# Patient Record
Sex: FEMALE | Race: BLACK OR AFRICAN AMERICAN | NOT HISPANIC OR LATINO | Employment: FULL TIME | URBAN - METROPOLITAN AREA
[De-identification: names, ages, dates, MRNs, and addresses within clinical notes are randomized per-mention and may not be internally consistent; named-entity substitution may affect disease eponyms.]

---

## 2020-03-02 ENCOUNTER — TELEPHONE (OUTPATIENT)
Dept: ORTHOPEDICS | Facility: CLINIC | Age: 30
End: 2020-03-02
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
## 2022-11-08 ENCOUNTER — OFFICE VISIT (OUTPATIENT)
Dept: OBSTETRICS AND GYNECOLOGY | Facility: CLINIC | Age: 32
End: 2022-11-08
Payer: COMMERCIAL

## 2022-11-08 VITALS
WEIGHT: 165.38 LBS | DIASTOLIC BLOOD PRESSURE: 72 MMHG | HEIGHT: 60 IN | SYSTOLIC BLOOD PRESSURE: 118 MMHG | BODY MASS INDEX: 32.47 KG/M2

## 2022-11-08 DIAGNOSIS — Z30.09 BIRTH CONTROL COUNSELING: Primary | ICD-10-CM

## 2022-11-08 PROCEDURE — 3078F PR MOST RECENT DIASTOLIC BLOOD PRESSURE < 80 MM HG: ICD-10-PCS | Mod: CPTII,S$GLB,, | Performed by: OBSTETRICS & GYNECOLOGY

## 2022-11-08 PROCEDURE — 3008F BODY MASS INDEX DOCD: CPT | Mod: CPTII,S$GLB,, | Performed by: OBSTETRICS & GYNECOLOGY

## 2022-11-08 PROCEDURE — 99202 OFFICE O/P NEW SF 15 MIN: CPT | Mod: S$GLB,,, | Performed by: OBSTETRICS & GYNECOLOGY

## 2022-11-08 PROCEDURE — 1159F MED LIST DOCD IN RCRD: CPT | Mod: CPTII,S$GLB,, | Performed by: OBSTETRICS & GYNECOLOGY

## 2022-11-08 PROCEDURE — 3078F DIAST BP <80 MM HG: CPT | Mod: CPTII,S$GLB,, | Performed by: OBSTETRICS & GYNECOLOGY

## 2022-11-08 PROCEDURE — 99202 PR OFFICE/OUTPT VISIT, NEW, LEVL II, 15-29 MIN: ICD-10-PCS | Mod: S$GLB,,, | Performed by: OBSTETRICS & GYNECOLOGY

## 2022-11-08 PROCEDURE — 3074F PR MOST RECENT SYSTOLIC BLOOD PRESSURE < 130 MM HG: ICD-10-PCS | Mod: CPTII,S$GLB,, | Performed by: OBSTETRICS & GYNECOLOGY

## 2022-11-08 PROCEDURE — 3008F PR BODY MASS INDEX (BMI) DOCUMENTED: ICD-10-PCS | Mod: CPTII,S$GLB,, | Performed by: OBSTETRICS & GYNECOLOGY

## 2022-11-08 PROCEDURE — 99999 PR PBB SHADOW E&M-EST. PATIENT-LVL III: CPT | Mod: PBBFAC,,, | Performed by: OBSTETRICS & GYNECOLOGY

## 2022-11-08 PROCEDURE — 99999 PR PBB SHADOW E&M-EST. PATIENT-LVL III: ICD-10-PCS | Mod: PBBFAC,,, | Performed by: OBSTETRICS & GYNECOLOGY

## 2022-11-08 PROCEDURE — 3074F SYST BP LT 130 MM HG: CPT | Mod: CPTII,S$GLB,, | Performed by: OBSTETRICS & GYNECOLOGY

## 2022-11-08 PROCEDURE — 1159F PR MEDICATION LIST DOCUMENTED IN MEDICAL RECORD: ICD-10-PCS | Mod: CPTII,S$GLB,, | Performed by: OBSTETRICS & GYNECOLOGY

## 2022-11-08 RX ORDER — BUPROPION HYDROCHLORIDE 150 MG/1
150 TABLET ORAL EVERY MORNING
COMMUNITY
End: 2023-01-25

## 2022-11-08 RX ORDER — IBUPROFEN 800 MG/1
800 TABLET ORAL 2 TIMES DAILY PRN
COMMUNITY
Start: 2022-11-01 | End: 2023-01-25

## 2022-11-08 RX ORDER — VALACYCLOVIR HYDROCHLORIDE 500 MG/1
TABLET, FILM COATED ORAL
COMMUNITY
Start: 2022-06-14 | End: 2023-01-25

## 2022-11-08 RX ORDER — METHYLPREDNISOLONE 4 MG/1
TABLET ORAL
COMMUNITY
Start: 2022-11-01 | End: 2023-01-25

## 2022-11-08 RX ORDER — KETOCONAZOLE 20 MG/G
CREAM TOPICAL EVERY MORNING
COMMUNITY
Start: 2022-08-22

## 2022-11-08 RX ORDER — FLUOXETINE 20 MG/1
20 TABLET ORAL
COMMUNITY
End: 2023-01-25

## 2022-11-08 RX ORDER — AZELASTINE 1 MG/ML
SPRAY, METERED NASAL
COMMUNITY
Start: 2022-11-01 | End: 2023-01-25

## 2022-11-08 NOTE — PROGRESS NOTES
PT HAS BEEN SEEN AT [facility] FOR [facility] AND REFERRED HER FOR NEXPLANON.    ROS:  GENERAL: No fever, chills, fatigability or weight loss.  VULVAR: No pain, no lesions and no itching.  VAGINAL: No relaxation, no itching, no discharge, no abnormal bleeding and no lesions.  ABDOMEN: No abdominal pain. Denies nausea. Denies vomiting. No diarrhea. No constipation  BREAST: Denies pain. No lumps. No discharge.  URINARY: No incontinence, no nocturia, no frequency and no dysuria.  CARDIOVASCULAR: No chest pain. No shortness of breath. No leg cramps.  NEUROLOGICAL: No headaches. No vision changes.  The remainder of the review of systems was negative.    PE:  General Appearance: overweight And Well developed. Well nourished. In no acute distress.    PROCEDURES:    PLAN:     DIAGNOSIS:  1. Birth control counseling        MEDICATIONS & ORDERS:  Orders Placed This Encounter    Liquid-Based Pap Smear, Screening       20 MIN D/W PT ON TYPES OF RELIABLE BIRTH CONTROL AND RISKS AND BENEFITS  The use of hormonal contraception has been fully discussed with the patient. We discussed all options including OCPs, transdermal patches, vaginal ring, Depo Provera injections, Implanon, and IUD. Warnings about anticipated minor side effects such as breakthrough spotting, nausea, breast tenderness, weight changes, acne, headaches, etc were given. She has been told of the more serious potential side effects such as MI, stroke, and deep vein thrombosis, all of which are very unlikely. She has been asked to report any signs of such serious problems immediately. The need for additional protection, such as a condom, to prevent exposure to sexually transmitted diseases has also been discussed- the patient has been clearly reminded that no hormonal contraceptive method can protect her against diseases such as HIV and others. She understands and wishes to take the medication as prescribed. She wishes to begin oral contraceptives  (estrogen/progesterone)      FOLLOW-UP: With me NEXPLANON.    Anuj Gomez Jr, MD, FACOG

## 2022-11-22 ENCOUNTER — TELEPHONE (OUTPATIENT)
Dept: OBSTETRICS AND GYNECOLOGY | Facility: CLINIC | Age: 32
End: 2022-11-22
Payer: COMMERCIAL

## 2022-11-22 NOTE — TELEPHONE ENCOUNTER
LVM for pt to call and schedule appt.   ----- Message from Kaylynn Thompson sent at 11/21/2022  3:39 PM CST -----  Name of Who is Calling: JUSTIN JOHNSON [[id number]]            What is the request in detail: Patient is requesting call back to get nexaplanon birth control inserted              Can the clinic reply by [facility]: no              What Number to Call Back if not in [facility]: [phone]

## 2023-01-17 ENCOUNTER — TELEPHONE (OUTPATIENT)
Dept: OBSTETRICS AND GYNECOLOGY | Facility: CLINIC | Age: 33
End: 2023-01-17
Payer: COMMERCIAL

## 2023-01-17 DIAGNOSIS — Z30.017 ENCOUNTER FOR INITIAL PRESCRIPTION OF NEXPLANON: Primary | ICD-10-CM

## 2023-01-17 NOTE — TELEPHONE ENCOUNTER
"----- Message from Sophia Maharaj sent at 1/17/2023  3:38 PM CST -----  Regarding: R/s appt  Contact: JUSTIN JOHNSON [[id number]]  Name of Who is Calling: Justin Johnson            What is the request in detail: Pt states she is requesting a call back to see if Dr. Gomez still has the "nexplanon" . If so she wants to be r/s asap. Please advise           Can the clinic reply by [facility]: No           What Number to Call Back if not in [facility]: Home Phone      [phone]  Work Phone      Not on file.  Mobile          [phone]       "

## 2023-01-25 ENCOUNTER — PROCEDURE VISIT (OUTPATIENT)
Dept: OBSTETRICS AND GYNECOLOGY | Facility: CLINIC | Age: 33
End: 2023-01-25
Payer: COMMERCIAL

## 2023-01-25 VITALS — BODY MASS INDEX: 32.03 KG/M2 | WEIGHT: 164 LBS | SYSTOLIC BLOOD PRESSURE: 118 MMHG | DIASTOLIC BLOOD PRESSURE: 74 MMHG

## 2023-01-25 DIAGNOSIS — Z30.017 INSERTION OF NEXPLANON: Primary | ICD-10-CM

## 2023-01-25 LAB
B-HCG UR QL: NEGATIVE
CTP QC/QA: YES

## 2023-01-25 PROCEDURE — 81025 POCT URINE PREGNANCY: ICD-10-PCS | Mod: S$GLB,,, | Performed by: OBSTETRICS & GYNECOLOGY

## 2023-01-25 PROCEDURE — 81025 URINE PREGNANCY TEST: CPT | Mod: S$GLB,,, | Performed by: OBSTETRICS & GYNECOLOGY

## 2023-01-25 PROCEDURE — 11981 INSERTION DRUG DLVR IMPLANT: CPT | Mod: S$GLB,,, | Performed by: OBSTETRICS & GYNECOLOGY

## 2023-01-25 PROCEDURE — 11981 INSERTION OF NEXPLANON: ICD-10-PCS | Mod: S$GLB,,, | Performed by: OBSTETRICS & GYNECOLOGY

## 2023-01-25 RX ORDER — DEXTROAMPHETAMINE SULFATE, DEXTROAMPHETAMINE SACCHARATE, AMPHETAMINE SULFATE AND AMPHETAMINE ASPARTATE 5; 5; 5; 5 MG/1; MG/1; MG/1; MG/1
CAPSULE, EXTENDED RELEASE ORAL EVERY MORNING
COMMUNITY
Start: 2023-01-17

## 2023-01-25 RX ORDER — ISOTRETINOIN 40 MG/1
40 CAPSULE, GELATIN COATED ORAL
COMMUNITY
Start: 2023-01-16

## 2023-01-25 RX ORDER — BUSPIRONE HYDROCHLORIDE 15 MG/1
TABLET ORAL
COMMUNITY
Start: 2022-01-08

## 2023-01-25 NOTE — PROCEDURES
Insertion of Nexplanon    Date/Time: 1/25/2023 10:15 AM  Performed by: Anuj Gomez Jr., MD  Authorized by: Anuj Gomez Jr., MD     Consent obtained:  Verbal  Consent given by:  Patient  Patient questions answered: yes    Patient agrees, verbalizes understanding, and wants to proceed: yes    Educational handouts given: yes    Instructions and paperwork completed: yes    Pre-procedure timeout performed: yes    Prepped with: povidone-iodine    Local anesthetic:  Lidocaine with epinephrine  The site was cleaned and prepped in a sterile fashion: yes    Small stab incision was made in arm: no    Left/right:  Left   68 mg etonogestreL 68 mg  Preloaded Implanon trocar was placed subdermally: yes    Visualization of implant was obtained: yes    Nexplanon was inserted and trocar removed: yes    Visualization of notch in stilette and palpitation of device: yes    Palpitation confirms placement by provider and patient: yes    Site was closed with steri-strips and pressure bandage applied: yes

## 2024-06-12 ENCOUNTER — OFFICE VISIT (OUTPATIENT)
Dept: OBSTETRICS AND GYNECOLOGY | Facility: CLINIC | Age: 34
End: 2024-06-12
Payer: COMMERCIAL

## 2024-06-12 VITALS
HEIGHT: 60 IN | WEIGHT: 163.81 LBS | BODY MASS INDEX: 32.16 KG/M2 | SYSTOLIC BLOOD PRESSURE: 120 MMHG | DIASTOLIC BLOOD PRESSURE: 86 MMHG

## 2024-06-12 DIAGNOSIS — Z32.02 NEGATIVE PREGNANCY TEST: ICD-10-CM

## 2024-06-12 DIAGNOSIS — Z30.46 NEXPLANON REMOVAL: Primary | ICD-10-CM

## 2024-06-12 LAB
B-HCG UR QL: NEGATIVE
CTP QC/QA: YES

## 2024-06-12 PROCEDURE — 11982 REMOVE DRUG IMPLANT DEVICE: CPT | Mod: S$GLB,,, | Performed by: FAMILY MEDICINE

## 2024-06-12 PROCEDURE — 99999 PR PBB SHADOW E&M-EST. PATIENT-LVL III: CPT | Mod: PBBFAC,,, | Performed by: FAMILY MEDICINE

## 2024-06-12 PROCEDURE — 99499 UNLISTED E&M SERVICE: CPT | Mod: S$GLB,,, | Performed by: FAMILY MEDICINE

## 2024-06-12 PROCEDURE — 81025 URINE PREGNANCY TEST: CPT | Mod: S$GLB,,, | Performed by: FAMILY MEDICINE

## 2024-06-12 NOTE — PROGRESS NOTES
Removal of Nexplanon Device    Date/Time: 6/12/2024 8:40 AM    Performed by: Scott Grier NP  Authorized by: Scott Grier NP    Consent obtained:  Prior to procedure the appropriate consent was completed and verified  Consent given by:  Patient  Procedure risks and benefits discussed: yes    Patient questions answered: yes    Patient agrees, verbalizes understanding, and wants to proceed: yes    Educational handouts given: yes    Instructions and paperwork completed: yes    Implant grasped by: hemostat  Other reason for removal:  Feels its causing weight gain, discussed may or may not be the issue but she would still like to remove. does not want contraception or pregnancy, advised condom use and let me know when she'd like to discuss further  Removed with no complications: yes       The procedure and minimal risks of pain, bleeding, bruising and infection at the insertion site discussed.   Written information provided; all questions answered and patient agrees to proceed.  Consent signed and scanned into computer.    UPT neg      Manage arm pain with NSAIDs, Tylenol or Rx   Keep arm elevated and apply intermittent ice packs to decrease pain and bruising for 24 Hours.  May remove bandage in 24 hours.      Arm: left arm  Palpation confirms location: yes  Small stab incision was made in arm: yes  Upon removal device was intact: yes  Site was close with steri-strips and pressure bandage applied: yes  Pre-procedure timeout performed: yes  Local anesthetic:  Lidocaine with epinephrine   The site was cleaned  and prepped in a sterile fashion: yes  Specimen sent to pathology: Yes

## 2024-06-14 ENCOUNTER — PATIENT MESSAGE (OUTPATIENT)
Dept: OBSTETRICS AND GYNECOLOGY | Facility: CLINIC | Age: 34
End: 2024-06-14
Payer: COMMERCIAL